# Patient Record
Sex: FEMALE | Race: WHITE | NOT HISPANIC OR LATINO | Employment: UNEMPLOYED | URBAN - METROPOLITAN AREA
[De-identification: names, ages, dates, MRNs, and addresses within clinical notes are randomized per-mention and may not be internally consistent; named-entity substitution may affect disease eponyms.]

---

## 2020-05-05 ENCOUNTER — TELEPHONE (OUTPATIENT)
Dept: FAMILY MEDICINE CLINIC | Facility: CLINIC | Age: 40
End: 2020-05-05

## 2020-05-05 NOTE — TELEPHONE ENCOUNTER
"Patient said a nurse \"Lacey Greenberg\" called from our office asking patient to call back. Her callback is [phone]   "

## 2020-05-05 NOTE — TELEPHONE ENCOUNTER
I called the patient back and left a message on her answering machine letting her know I thought her meesage may have come to the wrong office since I did not see my name or Dr. Joel's name associated with ehr chart. I offered to help her get to the correct office and left our number for her to call back.